# Patient Record
Sex: MALE | Race: WHITE
[De-identification: names, ages, dates, MRNs, and addresses within clinical notes are randomized per-mention and may not be internally consistent; named-entity substitution may affect disease eponyms.]

---

## 2019-03-10 ENCOUNTER — HOSPITAL ENCOUNTER (OUTPATIENT)
Dept: HOSPITAL 89 - AMB | Age: 39
End: 2019-03-10
Payer: COMMERCIAL

## 2019-03-10 ENCOUNTER — HOSPITAL ENCOUNTER (INPATIENT)
Dept: HOSPITAL 89 - BHS | Age: 39
LOS: 1 days | Discharge: HOME | DRG: 880 | End: 2019-03-11
Attending: REGISTERED NURSE | Admitting: REGISTERED NURSE
Payer: COMMERCIAL

## 2019-03-10 ENCOUNTER — HOSPITAL ENCOUNTER (EMERGENCY)
Dept: HOSPITAL 89 - ER | Age: 39
Discharge: PSYCHIATRIC OR SUBSTANCE USE FACILITY | End: 2019-03-10
Payer: COMMERCIAL

## 2019-03-10 VITALS — WEIGHT: 222 LBS | BODY MASS INDEX: 29.42 KG/M2 | HEIGHT: 73 IN

## 2019-03-10 VITALS — DIASTOLIC BLOOD PRESSURE: 82 MMHG | SYSTOLIC BLOOD PRESSURE: 136 MMHG

## 2019-03-10 VITALS — DIASTOLIC BLOOD PRESSURE: 96 MMHG | SYSTOLIC BLOOD PRESSURE: 130 MMHG

## 2019-03-10 VITALS — SYSTOLIC BLOOD PRESSURE: 132 MMHG | DIASTOLIC BLOOD PRESSURE: 88 MMHG

## 2019-03-10 DIAGNOSIS — R40.2431: ICD-10-CM

## 2019-03-10 DIAGNOSIS — F10.10: ICD-10-CM

## 2019-03-10 DIAGNOSIS — F41.1: Primary | ICD-10-CM
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
## 2019-03-10 DIAGNOSIS — R56.9: Primary | ICD-10-CM

## 2019-03-10 DIAGNOSIS — F12.10: ICD-10-CM

## 2019-03-10 DIAGNOSIS — Z88.1: ICD-10-CM

## 2019-03-10 DIAGNOSIS — T50.902A: Primary | ICD-10-CM

## 2019-03-10 LAB — PLATELET COUNT, AUTOMATED: 358 K/UL (ref 150–450)

## 2019-03-10 PROCEDURE — 83735 ASSAY OF MAGNESIUM: CPT

## 2019-03-10 PROCEDURE — 84155 ASSAY OF PROTEIN SERUM: CPT

## 2019-03-10 PROCEDURE — 84132 ASSAY OF SERUM POTASSIUM: CPT

## 2019-03-10 PROCEDURE — 82040 ASSAY OF SERUM ALBUMIN: CPT

## 2019-03-10 PROCEDURE — 85025 COMPLETE CBC W/AUTO DIFF WBC: CPT

## 2019-03-10 PROCEDURE — 82374 ASSAY BLOOD CARBON DIOXIDE: CPT

## 2019-03-10 PROCEDURE — 81001 URINALYSIS AUTO W/SCOPE: CPT

## 2019-03-10 PROCEDURE — 84295 ASSAY OF SERUM SODIUM: CPT

## 2019-03-10 PROCEDURE — 82435 ASSAY OF BLOOD CHLORIDE: CPT

## 2019-03-10 PROCEDURE — 80305 DRUG TEST PRSMV DIR OPT OBS: CPT

## 2019-03-10 PROCEDURE — 84460 ALANINE AMINO (ALT) (SGPT): CPT

## 2019-03-10 PROCEDURE — 82247 BILIRUBIN TOTAL: CPT

## 2019-03-10 PROCEDURE — 99284 EMERGENCY DEPT VISIT MOD MDM: CPT

## 2019-03-10 PROCEDURE — 84443 ASSAY THYROID STIM HORMONE: CPT

## 2019-03-10 PROCEDURE — 80329 ANALGESICS NON-OPIOID 1 OR 2: CPT

## 2019-03-10 PROCEDURE — 82310 ASSAY OF CALCIUM: CPT

## 2019-03-10 PROCEDURE — 96361 HYDRATE IV INFUSION ADD-ON: CPT

## 2019-03-10 PROCEDURE — 80320 DRUG SCREEN QUANTALCOHOLS: CPT

## 2019-03-10 PROCEDURE — 84075 ASSAY ALKALINE PHOSPHATASE: CPT

## 2019-03-10 PROCEDURE — 82947 ASSAY GLUCOSE BLOOD QUANT: CPT

## 2019-03-10 PROCEDURE — 96372 THER/PROPH/DIAG INJ SC/IM: CPT

## 2019-03-10 PROCEDURE — 84520 ASSAY OF UREA NITROGEN: CPT

## 2019-03-10 PROCEDURE — 82565 ASSAY OF CREATININE: CPT

## 2019-03-10 PROCEDURE — 96360 HYDRATION IV INFUSION INIT: CPT

## 2019-03-10 PROCEDURE — 84450 TRANSFERASE (AST) (SGOT): CPT

## 2019-03-10 RX ADMIN — SODIUM CHLORIDE PRN MG: 900 INJECTION, SOLUTION INTRAVENOUS at 15:45

## 2019-03-10 RX ADMIN — SODIUM CHLORIDE PRN MG: 900 INJECTION, SOLUTION INTRAVENOUS at 14:48

## 2019-03-10 RX ADMIN — SODIUM CHLORIDE PRN MG: 900 INJECTION, SOLUTION INTRAVENOUS at 15:17

## 2019-03-10 RX ADMIN — SODIUM CHLORIDE PRN MG: 900 INJECTION, SOLUTION INTRAVENOUS at 15:46

## 2019-03-10 NOTE — ER REPORT
History and Physical


Time Seen By MD:  13:46


HPI/ROS


This is a 39-year-old male who works as a  in a local restaurant. He 


was brought in by paramedics after he was resuscitated with Narcan. Upon 


arrival, the patient states that he has been drinking alcohol and has been using


various drugs. He is inconsistent with his story. Says he "doesn't want to get 


anyone in trouble." Then said he took drugs and drank alcohol in a suicide 


attempt. Reports relationship and drug problems. says he used "magic mushrooms,"


but also says he has a "heroin" problem. Denies IVDA. Endorses SI Reports 


relationship problems. Difficult to obtain a history due to his intoxication.


Remainder of the 14 system rev:  Yes


Reviewed Nurses Notes:  Yes


Old Medical Records Reviewed:  Yes


Hx Smoking:  Yes


Smoking Status:  Current: Every Day Smoker


Hx Substance Use Disorder:  Yes


Hx Alcohol Use:  Yes


Constitutional





Vital Sign - Last 24 Hours








 3/10/19 3/10/19 3/10/19 3/10/19





 13:45 13:46 13:50 13:53


 


Temp    97.7


 


Pulse 142 138  


 


Resp 23 20  


 


B/P (MAP)  139/99 138/95 (109) 


 


Pulse Ox 100 98  


 


O2 Delivery  Nasal Cannula  


 


    





 3/10/19 3/10/19 3/10/19 3/10/19





 13:55 14:00 14:15 14:45


 


Pulse   118 104


 


Resp   22 24


 


B/P (MAP) 137/129 (132) 136/98 (111)  


 


Pulse Ox    100





 3/10/19 3/10/19 3/10/19 





 15:15 15:45 16:15 


 


Pulse 96 100 83 


 


Resp 19 18 14 


 


B/P (MAP) 132/88 (103)   


 


Pulse Ox 100 100 100 








Physical Exam


General Appearance: The patient is alert, has no immediate need for airway 


protection and no current signs of toxicity.  


Eyes: Pupils equal and round no injection.


Respiratory: Chest is non tender, lungs are clear to auscultation.


Cardiac: regular rhythm, tachycardic


Gastrointestinal: Abdomen is soft and non tender, no masses, bowel sounds 


normal.


Skin: No rashes or lesions.


Psych: endorses SI, no HI. 








DIFFERENTIAL DIAGNOSIS: After history and physical exam differential diagnosis 


was considered for altered mental status including but not limited to 


hypoglycemia, infectious process, electrolyte abnormality, head injury and 


intoxicants.





Medical Decision Making


Data Points


Result Diagram:  


3/10/19 1338                                                                    


           3/10/19 1338





Laboratory





Hematology








Test


 3/10/19


13:38 3/10/19


15:56


 


Red Blood Count


 5.36 M/uL


(4.00-5.60) 





 


Mean Corpuscular Volume


 88.6 fL


(80.0-96.0) 





 


Mean Corpuscular Hemoglobin


 29.9 pg


(26.0-33.0) 





 


Mean Corpuscular Hemoglobin


Concent 33.7 g/dL


(32.0-36.0) 





 


Red Cell Distribution Width


 14.5 %


(11.5-14.5) 





 


Mean Platelet Volume


 8.4 fL


(7.2-11.1) 





 


Neutrophils (%) (Auto)


 50.8 %


(39.4-72.5) 





 


Lymphocytes (%) (Auto)


 39.0 %


(17.6-49.6) 





 


Monocytes (%) (Auto)


 7.7 %


(4.1-12.4) 





 


Eosinophils (%) (Auto)


 1.6 %


(0.4-6.7) 





 


Basophils (%) (Auto)


 0.9 %


(0.3-1.4) 





 


Nucleated RBC Relative Count


(auto) 0.0 /100WBC 


 





 


Neutrophils # (Auto)


 7.1 K/uL


(2.0-7.4) 





 


Lymphocytes # (Auto)


 5.4 K/uL


(1.3-3.6) 





 


Monocytes # (Auto)


 1.1 K/uL


(0.3-1.0) 





 


Eosinophils # (Auto)


 0.2 K/uL


(0.0-0.5) 





 


Basophils # (Auto)


 0.1 K/uL


(0.0-0.1) 





 


Nucleated RBC Absolute Count


(auto) 0.00 K/uL 


 





 


Sodium Level


 140 mmol/L


(137-145) 





 


Potassium Level


 3.4 mmol/L


(3.5-5.0) 





 


Chloride Level


 105 mmol/L


() 





 


Carbon Dioxide Level


 22 mmol/L


(22-30) 





 


Blood Urea Nitrogen


 14 mg/dl


(9-21) 





 


Creatinine


 0.90 mg/dl


(0.66-1.25) 





 


Glomerular Filtration Rate


Calc > 60.0 


 





 


Random Glucose


 122 mg/dl


() 





 


Calcium Level


 9.6 mg/dl


(8.4-10.2) 





 


Magnesium Level


 2.2 mg/dl


(1.7-2.2) 





 


Total Bilirubin


 0.4 mg/dl


(0.2-1.3) 





 


Aspartate Amino Transf


(AST/SGOT) 44 U/L (0-35) 


 





 


Alanine Aminotransferase


(ALT/SGPT) 65 U/L (0-56) 


 





 


Alkaline Phosphatase


 109 U/L


(0-126) 





 


Total Protein


 8.2 g/dl


(6.3-8.2) 





 


Albumin


 4.9 g/dl


(3.5-5.0) 





 


Salicylates Level < 10 mg/L  


 


Salicylate Last Dose Date unk  


 


Acetaminophen Level < 10 ug/ml  


 


Serum Alcohol 80 mg/dl  


 


Urine Color  Straw 


 


Urine Clarity  Clear 


 


Urine pH


 


 5.0 pH


(4.8-9.5)


 


Urine Specific Gravity  1.009 


 


Urine Protein


 


 Negative mg/dL


(NEGATIVE)


 


Urine Glucose (UA)


 


 Negative mg/dL


(NEGATIVE)


 


Urine Ketones


 


 Negative mg/dL


(NEGATIVE)


 


Urine Blood


 


 Negative


(NEGATIVE)


 


Urine Nitrite


 


 Negative


(NEGATIVE)


 


Urine Bilirubin


 


 Negative


(NEGATIVE)


 


Urine Urobilinogen


 


 Negative mg/dL


(0.2-1.9)


 


Urine Leukocyte Esterase


 


 Trace


(NEGATIVE)


 


Urine RBC


 


 1 /HPF


(0-2/HPF)


 


Urine WBC


 


 1 /HPF


(0-5/HPF)


 


Urine Squamous Epithelial


Cells 


 Moderate /LPF


(</=FEW)


 


Urine Bacteria


 


 Negative /HPF


(NONE-FEW)


 


Urine Hyaline Casts


 


 Few /LPF


(NONE-FEW)


 


Urine Mucus


 


 Few /HPF


(NONE-FEW)


 


Urine Opiates Screen  Negative 


 


Urine Barbiturates Screen  Negative 


 


Ur Tricyclic Antidepressants


Screen 


 Negative 





 


Urine Phencyclidine Screen  Negative 


 


Urine Amphetamines Screen  Negative 


 


Urine Benzodiazepines Screen  Negative 


 


Urine Cocaine Screen  Negative 


 


Urine Cannabinoids Screen  Positive 








Chemistry








Test


 3/10/19


13:38 3/10/19


15:56


 


White Blood Count


 13.9 k/uL


(4.5-11.0) 





 


Red Blood Count


 5.36 M/uL


(4.00-5.60) 





 


Hemoglobin


 16.0 g/dL


(14.0-18.0) 





 


Hematocrit


 47.5 %


(42.0-52.0) 





 


Mean Corpuscular Volume


 88.6 fL


(80.0-96.0) 





 


Mean Corpuscular Hemoglobin


 29.9 pg


(26.0-33.0) 





 


Mean Corpuscular Hemoglobin


Concent 33.7 g/dL


(32.0-36.0) 





 


Red Cell Distribution Width


 14.5 %


(11.5-14.5) 





 


Platelet Count


 358 K/uL


(150-450) 





 


Mean Platelet Volume


 8.4 fL


(7.2-11.1) 





 


Neutrophils (%) (Auto)


 50.8 %


(39.4-72.5) 





 


Lymphocytes (%) (Auto)


 39.0 %


(17.6-49.6) 





 


Monocytes (%) (Auto)


 7.7 %


(4.1-12.4) 





 


Eosinophils (%) (Auto)


 1.6 %


(0.4-6.7) 





 


Basophils (%) (Auto)


 0.9 %


(0.3-1.4) 





 


Nucleated RBC Relative Count


(auto) 0.0 /100WBC 


 





 


Neutrophils # (Auto)


 7.1 K/uL


(2.0-7.4) 





 


Lymphocytes # (Auto)


 5.4 K/uL


(1.3-3.6) 





 


Monocytes # (Auto)


 1.1 K/uL


(0.3-1.0) 





 


Eosinophils # (Auto)


 0.2 K/uL


(0.0-0.5) 





 


Basophils # (Auto)


 0.1 K/uL


(0.0-0.1) 





 


Nucleated RBC Absolute Count


(auto) 0.00 K/uL 


 





 


Glomerular Filtration Rate


Calc > 60.0 


 





 


Calcium Level


 9.6 mg/dl


(8.4-10.2) 





 


Magnesium Level


 2.2 mg/dl


(1.7-2.2) 





 


Total Bilirubin


 0.4 mg/dl


(0.2-1.3) 





 


Aspartate Amino Transf


(AST/SGOT) 44 U/L (0-35) 


 





 


Alanine Aminotransferase


(ALT/SGPT) 65 U/L (0-56) 


 





 


Alkaline Phosphatase


 109 U/L


(0-126) 





 


Total Protein


 8.2 g/dl


(6.3-8.2) 





 


Albumin


 4.9 g/dl


(3.5-5.0) 





 


Salicylates Level < 10 mg/L  


 


Salicylate Last Dose Date unk  


 


Acetaminophen Level < 10 ug/ml  


 


Serum Alcohol 80 mg/dl  


 


Urine Color  Straw 


 


Urine Clarity  Clear 


 


Urine pH


 


 5.0 pH


(4.8-9.5)


 


Urine Specific Gravity  1.009 


 


Urine Protein


 


 Negative mg/dL


(NEGATIVE)


 


Urine Glucose (UA)


 


 Negative mg/dL


(NEGATIVE)


 


Urine Ketones


 


 Negative mg/dL


(NEGATIVE)


 


Urine Blood


 


 Negative


(NEGATIVE)


 


Urine Nitrite


 


 Negative


(NEGATIVE)


 


Urine Bilirubin


 


 Negative


(NEGATIVE)


 


Urine Urobilinogen


 


 Negative mg/dL


(0.2-1.9)


 


Urine Leukocyte Esterase


 


 Trace


(NEGATIVE)


 


Urine RBC


 


 1 /HPF


(0-2/HPF)


 


Urine WBC


 


 1 /HPF


(0-5/HPF)


 


Urine Squamous Epithelial


Cells 


 Moderate /LPF


(</=FEW)


 


Urine Bacteria


 


 Negative /HPF


(NONE-FEW)


 


Urine Hyaline Casts


 


 Few /LPF


(NONE-FEW)


 


Urine Mucus


 


 Few /HPF


(NONE-FEW)


 


Urine Opiates Screen  Negative 


 


Urine Barbiturates Screen  Negative 


 


Ur Tricyclic Antidepressants


Screen 


 Negative 





 


Urine Phencyclidine Screen  Negative 


 


Urine Amphetamines Screen  Negative 


 


Urine Benzodiazepines Screen  Negative 


 


Urine Cocaine Screen  Negative 


 


Urine Cannabinoids Screen  Positive 








Toxicology








Test


 3/10/19


13:38 3/10/19


15:56


 


Salicylates Level < 10 mg/L  


 


Salicylate Last Dose Date unk  


 


Acetaminophen Level < 10 ug/ml  


 


Serum Alcohol 80 mg/dl  


 


Urine Opiates Screen  Negative 


 


Urine Barbiturates Screen  Negative 


 


Ur Tricyclic Antidepressants


Screen 


 Negative 





 


Urine Phencyclidine Screen  Negative 


 


Urine Amphetamines Screen  Negative 


 


Urine Benzodiazepines Screen  Negative 


 


Urine Cocaine Screen  Negative 


 


Urine Cannabinoids Screen  Positive 








Urinalysis








Test


 3/10/19


15:56


 


Urine Color Straw 


 


Urine Clarity Clear 


 


Urine pH


 5.0 pH


(4.8-9.5)


 


Urine Specific Gravity 1.009 


 


Urine Protein


 Negative mg/dL


(NEGATIVE)


 


Urine Glucose (UA)


 Negative mg/dL


(NEGATIVE)


 


Urine Ketones


 Negative mg/dL


(NEGATIVE)


 


Urine Blood


 Negative


(NEGATIVE)


 


Urine Nitrite


 Negative


(NEGATIVE)


 


Urine Bilirubin


 Negative


(NEGATIVE)


 


Urine Urobilinogen


 Negative mg/dL


(0.2-1.9)


 


Urine Leukocyte Esterase


 Trace


(NEGATIVE)


 


Urine RBC


 1 /HPF


(0-2/HPF)


 


Urine WBC


 1 /HPF


(0-5/HPF)


 


Urine Squamous Epithelial


Cells Moderate /LPF


(</=FEW)


 


Urine Bacteria


 Negative /HPF


(NONE-FEW)


 


Urine Hyaline Casts


 Few /LPF


(NONE-FEW)


 


Urine Mucus


 Few /HPF


(NONE-FEW)











ED Course/Re-evaluation


ED Course


Endorses SI multiple times, so I placed him on involuntary behavioral health 


admission order. UDS inconclusive. Became beligerent, and was threatening staff,


so give Ketamine and then Haldol and Ativan. Medically cleared for BH admission 


for SI.


Decision to Disposition Date:  Mar 10, 2019


Decision to Disposition Time:  17:39





Depart


Departure


Latest Vital Signs





Vital Signs








  Date Time  Temp Pulse Resp B/P (MAP) Pulse Ox O2 Delivery O2 Flow Rate FiO2


 


3/10/19 16:15  83 14  100   


 


3/10/19 15:15    132/88 (103)    


 


3/10/19 13:53 97.7       


 


3/10/19 13:46      Nasal Cannula  








Impression:  


   Primary Impression:  


   Suicidal intent


   Additional Impression:  


   Overdose


Condition:  Improved


Disposition:  XFER TO [facility] UNIT





Problem Qualifiers








   Additional Impression:  


   Overdose


   Encounter type:  initial encounter  Injury intent:  intentional self-harm  


   Qualified Codes:  T50.902A - Poisoning by unspecified drugs, medicaments and 


   biological substances, intentional self-harm, initial encounter








SONIA CANALES MD                 Mar 10, 2019 13:46

## 2019-03-11 VITALS — DIASTOLIC BLOOD PRESSURE: 70 MMHG | SYSTOLIC BLOOD PRESSURE: 100 MMHG

## 2019-03-11 VITALS — DIASTOLIC BLOOD PRESSURE: 85 MMHG | SYSTOLIC BLOOD PRESSURE: 131 MMHG

## 2019-03-11 RX ADMIN — NICOTINE PRN MG: 4 INHALANT RESPIRATORY (INHALATION) at 14:19

## 2019-03-11 RX ADMIN — NICOTINE PRN MG: 4 INHALANT RESPIRATORY (INHALATION) at 07:47

## 2019-03-11 NOTE — HISTORY AND PHYSICAL
HISTORY AND PHYSICAL/DISCHARGE SUMMARY FOR AN UNDER 24-HOUR ADMISSION



DATE OF ADMISSION:  March 10, 2019



The patient was interviewed on 2019, at 9:30 a.m. for this history and

physical.



CHIEF COMPLAINT  

"I was behind [employer] and then I passed out.  I came to in the ambulance.  

When I came to, apparently I said I wanted to die but I don't remember 

anything".



HISTORY OF PRESENT ILLNESS 

This is the first-ever psychiatric admission for this 39-year old man who has a 

past history of anxiety and ADHD and who is a voluntary patient.  The patient 

was at [facility] at about noon and had one beer and was smoking a cigarette 

outdoors when apparently he passed out. EMTs were called and they found him 

unresponsive and they did give him Narcan.  However, the patient denies any 

history of opiate abuse and his drug screen was negative for opiates. In any 

case, they transported him to the hospital, where he was intoxicated with a 

blood alcohol of 80.  He was somewhat agitated and out of it in the ER and did 

make some kind of statement that he wanted to die.  According to the ER 

physician, he was inconsistent with his story.  He became belligerent in the ER 

and was threatening staff so he was given ketamine and then Haldol and Ativan.  

The patient mentioned something about using magic mushrooms but he says that was

after he was given the ketamine.  He was emergency detained by the ER physician 

and brought up to the Psychiatry Unit.  He slept for quite a while and once he 

woke up he has been entirely cooperative and pleasant so far here on the Unit.  

The patient does say he has a past history of generalized anxiety disorder and 

at times has become so anxious that he hyperventilates and passes out.  He said 

he had a similar episode four years ago when his father .  He does say he 

has been having problems with his girlfriend and on the morning of admission she

and he had agreed mutually that he would move out.  He does say that he was 

upset about this and he was crying that morning and was feeling very anxious 

about this life change and he thinks that maybe the reason he passed out was 

because of the anxiety.  He denies any recent neurological symptoms, denies any 

headaches.  Denies visual changes.  Denies difficulty with his gait.  Denies 

dropping things.  Denies weakness, numbness or tingling.  He has never had a 

suicide attempt and has never had any significant suicidal ideation in the past.



PAST PSYCHIATRIC HISTORY

He was treated for ADHD as a child with Vyvanse and Adderall and he took those 

medications until he was in his mid 20s but has had none since.  His doctors 

have told him that he has anxiety but he was never medicated for this and has 

never seen a therapist for it.  He is not currently in any kind of therapy.  He 

has no history of suicide attempt.  He has never been in patient psych before. 



SOCIAL HISTORY 

He was born in [place] to parents who were .  They moved around a lot in

[place] and ultimately to [place].  He grew up mostly in [place] and 

attended high school in [place], [place].  He dropped out, saying that high 

school was too hard for him because of his ADHD.  He did get a GED.  He worked 

in the [employer] program for two years, which he says were very meaningful for 

him.  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 

elementary school students. Since [employer], he worked at [employer] for seven 

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  He currently works at 

[employer].  He was  at the age of 25 and  at the age of 35.  He 

has no children. 



PAST MEDICAL HISTORY 

Negative.



ALLERGIES

ERYTHROMYCIN.



CURRENT MEDICATIONS

He is on no medications currently. 



LEGAL HISTORY

He denies any history of legal trouble.



SUBSTANCE ABUSE HISTORY

He smokes marijuana about once every two weeks.  He has tried psychedelics in 

the distant past.  He drinks alcohol one to three beers per day and on the 

weekend he might drink four or five at a time.  He denies any history of 

physical or sexual abuse.



PHYSICAL EXAMINATION 

Please see the emergency room physician's report.

VITAL SIGNS:  Temperature 97.4, pulse 71, respiratory rate 15, blood pressure 

100/70, pulse ox 93% on room air.



LABORATORY DATA 

Urinalysis was essentially normal with trace leukocyte esterase.  Tox screen was

positive for cannabinoids.  Serum alcohol was 80.  The remainder of the tox 

screen was negative.  Chemistry panel showed a low potassium at 3.4, a high 

random glucose at 122.  AST is elevated at 44.  ALT is elevated at 65.  CBC is 

within normal limits other than a high white count of 13.9.  However, there is 

no left shift.  If anything, there is a right shift with lymphocytes at 39% and 

neutrophils at 50.8%. 



MENTAL STATUS EXAM

The patient is clean and well-groomed, dressed in hospital scrubs.  He was very 

polite and very cooperative.  He apologized immediately for his behavior in the 

emergency room yesterday.  He says that he felt intoxicated and felt that the 

ketamine made him feel high.  His speech was normal in rate, tone and volume.  

He displayed good eye contact.  He was a forthcoming historian.  Mood and affect

with euthymic.  Thought process was logical and goal-directed.  Thought content 

was negative for any current suicidal ideation.  He denies any suicidal ideation

yesterday.  He denies any problems with recent persistent suicidal ideation.  He

denies homicidal ideation.  There are no auditory hallucinations.  No visual 

hallucinations.  No delusions.  He is alert and fully oriented to person, place,

time and situation.  Memory is intact for immediate, recent and remote call. 

Intelligence is average based on interview.  Insight and judgment are good. 



ASSESSMENT 

1.  Generalized anxiety disorder by history.

2.  Alcohol use disorder, moderate.

3.  Cannabis use disorder, moderate.



HOSPITAL COURSE

The patient was admitted to [facility] and we did get his permission to speak with his 

girlfriend on the phone.  She did not have any recent concerns about him being 

suicidal and she corroborated his history.  The patient did stay at [facility] all day 

and work on anxiety coping skills.  He did agree to see an outpatient therapist 

and we arranged outpatient followup for him at [facility], where he 

will go tomorrow to do their intake process and attend outpatient therapy.  

There was never any indication that there was a need for any psychiatric 

medications.  Throughout his brief hospital stay, once he had sobered up, he 

denied any suicidal ideation consistently and was consistently pleasant and 

cooperative throughout his brief hospital stay. He is discharged home in 

improved condition to follow up with [facility].

 

BARRY

## 2019-03-11 NOTE — BHS - PSYCHIATRIC EVALUATION
ER - Title 25 MHE Evaluation


Title 25 Evaluation


Patient Detained By:  Physician (Dr. Mazariegos)


Referral Source:  Professional: Dr. Mazariegos


Date Patient Detained:  Mar 10, 2019


Time Patient Detained:  14:35


Date [facility] Expires:  Mar 13, 2019


Time [facility] Expires:  14:35


Legal Status: Police Hold:  No


Legal Status: Residence:  [facility] Resident, State Resident


Assessment Data Provided By:  Patient, Law Enforcement (3-81), Friend(s) 


(Girlfriend provides collateral report that patient has never been suicidal 


before.), Other Source (Dr. Cherrie Bridges)


HPI/ROS:  


From ER physician, DR Elizabeth Mazariegos, "This is a 39-year-old male who works as


a  in a local restaurant. He was brought in by paramedics after


he was resuscitated with Narcan. Upon arrival, the patient states that he


has been drinking alcohol and has been using various drugs. He is


inconsistent with his story. Says he "doesn't want to get anyone in


trouble." Then said he took drugs and drank alcohol in a suicide attempt.


Reports relationship and drug problems. says he used "magic mushrooms,"


but also says he has a "heroin" problem. Denies IVDA. Endorses SI Reports


relationship problems. Difficult to obtain a history due to his


intoxication."


Admit due to SI or Attempt:  Yes


Suicide Plan:  No Plan


Current Suicide Plan


Denies any current suicidality.


Alcohol or Drugs Involved:  Yes (Patient acknowledges drinking heavily for him. 


Patient toxicity sceen is positive for cannabinoids.)


Current Intoxication Info:


Dr. Bridges stated the Narcan given did not have a conclusive UDS .


Is Patient Info Reliable:  Yes


Is Collateral Info Reliable:  Yes


Current Home Psych Meds:


None





Mental Status Exam


General Appearance:  Casual, Well Groomed, Good Eye Contact, Cooperative, Alessandro


ite, Good Interaction


Speech:  Clear, Spontaneous, Normal Rate, Normal Rhythm, Normal Volume, Normal 


Tone


Mood:  Euthymic


Affect:  Full and Appropriate, Calm


Thought Process:  Organized, Logical, Goal Directed


Thought Content:  Suicidal Ideation (Denies, says, "I was just drunk.  I am 


sorry for the way I acted.  I think I had a panic attack too." )


Sensorium:  Clear


Cognition:  Alert & Oriented-Person, Alert & Oriented-Place, Alert & Oriented-


Time, Alert-Oriented-Situation


Memory:  Immediate


Insight Judgment:  Intact, Appropriate


Sleep:  Normal


Hallucinations:  Denies


Delusions:  Denies





Current Risk & History


Current Dangerous Risk Assessm:  Current Suicide Ideation (Denies, seems 


reasonable that patient had too much to drink and acted way out of character for


him.)


Past Dangerous Risk Assessm:  Other (Denies any suicidal ideation ever. )


Prior Alcohol/Drug Abuse


Says he does not want to drink any longer because this intermediate is a lesson for


him, and the consequence of being detained is something that he does not want to


happen again.


Previous Suicide Attempt:  No Previous Attempt


Previous Psychiatric Illness:  No


Previous Psychiatric Treatment:  No





Risk Assessment & Disposition


Evaluated Risk Assessment:


Patient risk is low, now that he is no longer intoxicated.


Meets Mental Illness Req.:  No


Meets Dangerousness Req.:  No


Emergency FCI to be:  Lifted


Decision Comment:


Patient was held here overnight until this mental health examiner could see him.


 He acknowledges drinking too much and acting foolishly.  he says he has not 


ever had suicidal thoughts before.  He is congenial and plans to seek outpatient


therapy  to discuss stressors that occur for him, especially related to his 


girlfriend.


Date of Decision:  Mar 11, 2019


Time of Decision:  11:15


Patient is Medically Stable at:  Yes


Disposition:  [facility]             Mar 11, 2019 11:16